# Patient Record
Sex: FEMALE | Race: BLACK OR AFRICAN AMERICAN | Employment: UNEMPLOYED | URBAN - METROPOLITAN AREA
[De-identification: names, ages, dates, MRNs, and addresses within clinical notes are randomized per-mention and may not be internally consistent; named-entity substitution may affect disease eponyms.]

---

## 2019-10-03 ENCOUNTER — HOSPITAL ENCOUNTER (EMERGENCY)
Age: 69
Discharge: HOME | End: 2019-10-03
Attending: EMERGENCY MEDICINE
Payer: MEDICARE

## 2019-10-03 VITALS
SYSTOLIC BLOOD PRESSURE: 175 MMHG | RESPIRATION RATE: 16 BRPM | HEART RATE: 75 BPM | HEIGHT: 59 IN | BODY MASS INDEX: 27.21 KG/M2 | OXYGEN SATURATION: 100 % | TEMPERATURE: 97.8 F | DIASTOLIC BLOOD PRESSURE: 91 MMHG | WEIGHT: 135 LBS

## 2019-10-03 DIAGNOSIS — T40.601A OPIATE OVERDOSE, ACCIDENTAL OR UNINTENTIONAL, INITIAL ENCOUNTER (HCC): Primary | ICD-10-CM

## 2019-10-03 LAB — GLUCOSE BLD STRIP.AUTO-MCNC: 120 MG/DL (ref 70–110)

## 2019-10-03 PROCEDURE — 82962 GLUCOSE BLOOD TEST: CPT

## 2019-10-03 PROCEDURE — 99284 EMERGENCY DEPT VISIT MOD MDM: CPT

## 2019-10-03 RX ORDER — NALOXONE HYDROCHLORIDE 4 MG/.1ML
SPRAY NASAL
Qty: 1 EACH | Refills: 0 | Status: SHIPPED | OUTPATIENT
Start: 2019-10-03 | End: 2021-07-21 | Stop reason: SDUPTHER

## 2019-10-03 NOTE — ED NOTES
was referred to patient by doctor.  went to [facility] to assess patient. Patient stated she was insured and she was homeless. Patient also stated she would like to receive follow up care in [place].  provided  follow up appointment for Adriano Abelherbie Soliz on 10/18/2019 at 6 am.  also provided care assistance to [employer] for suboxone, on 10/7/2019 for 8:30 am, and a list of things she will need to bring for admission.  would provide transportation to both appointments, which patient was informed on.  gave patient a food pantry and shelter list.Patient received  contact information.

## 2019-10-03 NOTE — ED PROVIDER NOTES
EMERGENCY DEPARTMENT HISTORY AND PHYSICAL EXAM    11:34 AM      Date: 10/3/2019  Patient Name: Bernice Brewer    History of Presenting Illness     Chief Complaint   Patient presents with    Drug Overdose         History Provided By: Patient  Location/Duration/Severity/Modifying factors   Patient is a 72-year-old female without reported medical history per patient presents the emergency department by EMS after being found somnolent with pinpoint pupils at a convenient store. The patient was staggering and  had slurred speech so EMS was called. EMS arrived and found the patient to pinpoint pupils and to be drowsy was given intranasal Narcan and then was immediately became alert and oriented. She initially was agitated per EMS his report however she is been calm during the majority of the transport. Patient in the emergency department is accompanied by police and is providing history however appears to be giving a limited history. Patient denies having a medical history but denies wanting to go to a doctor going to a doctor in the past.  Patient is to being a smoker however denies any drinking and is elusive about her drug use. Patient did state that she is taking aspirin that was all that she is taken. Patient denies any other aggravating relieving factors. PCP: None        Past History     Past Medical History:  No past medical history on file. Past Surgical History:  No past surgical history on file. Family History:  No family history on file. Social History:  Social History     Tobacco Use    Smoking status: Current Some Day Smoker     Packs/day: 0.25   Substance Use Topics    Alcohol use: Yes     Alcohol/week: 1.0 standard drinks     Types: 1 Cans of beer per week    Drug use: Yes     Types: Marijuana       Allergies:  No Known Allergies      Review of Systems       Review of Systems   Constitutional: Positive for activity change. Negative for fatigue and fever.    HENT: Negative for congestion and rhinorrhea. Eyes: Negative for visual disturbance. Respiratory: Negative for shortness of breath. Cardiovascular: Negative for chest pain and palpitations. Gastrointestinal: Negative for abdominal pain, diarrhea, nausea and vomiting. Genitourinary: Negative for dysuria and hematuria. Musculoskeletal: Negative for back pain. Skin: Negative for rash. Neurological: Negative for dizziness, weakness and light-headedness. All other systems reviewed and are negative. Physical Exam     Visit Vitals  BP (!) 175/91 (BP 1 Location: Left arm, BP Patient Position: At rest)   Pulse 75   Temp 97.8 °F (36.6 °C)   Resp 16   Ht 4' 11\" (1.499 m)   Wt 61.2 kg (135 lb)   SpO2 100%   BMI 27.27 kg/m²         Physical Exam   Constitutional: She is oriented to person, place, and time. She appears well-developed and well-nourished. No distress. HENT:   Head: Normocephalic and atraumatic. Right Ear: External ear normal.   Left Ear: External ear normal.   Nose: Nose normal.   Mouth/Throat: Oropharynx is clear and moist.   Eyes: Pupils are equal, round, and reactive to light. Conjunctivae and EOM are normal. No scleral icterus. Neck: Normal range of motion. Neck supple. No JVD present. No tracheal deviation present. No thyromegaly present. Cardiovascular: Normal rate, regular rhythm, normal heart sounds and intact distal pulses. Exam reveals no gallop and no friction rub. No murmur heard. Pulmonary/Chest: Effort normal and breath sounds normal. She exhibits no tenderness. Abdominal: Soft. Bowel sounds are normal. She exhibits no distension. There is no tenderness. There is no rebound and no guarding. Musculoskeletal: Normal range of motion. She exhibits no edema or tenderness. Lymphadenopathy:     She has no cervical adenopathy. Neurological: She is alert and oriented to person, place, and time. No cranial nerve deficit.  Coordination normal.   No sensory loss, Gait normal, Motor 5/5 Skin: Skin is warm and dry. Psychiatric: She has a normal mood and affect. Her behavior is normal. Judgment and thought content normal.   Seems a bit elusive when discussing drug past, please at the bedside however the patient is not in custody   Nursing note and vitals reviewed. Diagnostic Study Results     Labs -  Recent Results (from the past 12 hour(s))   GLUCOSE, POC    Collection Time: 10/03/19 12:12 PM   Result Value Ref Range    Glucose (POC) 120 (H) 70 - 110 mg/dL       Radiologic Studies -   No orders to display         Medical Decision Making   I am the first provider for this patient. I reviewed the vital signs, available nursing notes, past medical history, past surgical history, family history and social history. Vital Signs-Reviewed the patient's vital signs. Records Reviewed: Nursing Notes and Old Medical Records (Time of Review: 11:34 AM)    ED Course: Progress Notes, Reevaluation, and Consults: The police are now gone and the patient admits to using 1 cap of heroin a day. I discussed with her that I would like to get her in to some resources regarding opiate abuse and she is resistant at this time. She agrees to let me check her blood sugar and arrange for primary care doctor. We will continue to monitor her for any signs of re-sedation, follow her blood sugar, and follow her outpatient care plan. Isidro Dillard,  11:52 AM    Patient's glucose is reassuring and the patient is at her baseline. Patient was seen by the life coaches and has been given an appointment for primary care follow-up in the coming days. Patient has been educated on signs of worsening and will return if at all worsened or concerned. In addition we will give her a prescription for Narcan and education on how to use it. Patient is also referred to the community services Board to initiate substance abuse care. Isidro Dillard DO 1:04 PM        Provider Notes (Medical Decision Making): MDM  Number of Diagnoses or Management Options  Diagnosis management comments: Patient is a 71-year-old female without significant medical history however is avoidant of healthcare the presents emergency department after being found drowsy and altered at a convenient store. Patient had a quick response to Narcan given intranasally he did not have a blood glucose check and now is alert and oriented without any complaints. Suspect the patient did have an opiate overdose however I do not suspect patient is being forthcoming about her use patterns. I suspect this is due to the police being at the bedside and after discussing with police will likely not be in custody so will rediscuss the substance abuse. We will call her peer  as well as have her life coaches talk to her about Suboxone programs. We will also check her blood sugar if she will let me however she seems to be resistant to medical interventions. Leigha Alejo, DO 11:42 AM        Procedures          Diagnosis     Clinical Impression:   1. Opiate overdose, accidental or unintentional, initial encounter [facility])        Disposition: DC    Follow-up Information     Follow up With Specialties Details Why Contact [facility] MEDICINE  Go on 10/18/2019 established care with primary care doctor with Dr. Carlos Lopez on  10/18/2019 at 11 am. [address] [place] [address] in 3 days Suboxone Intake appointment. [address] [place] [address] Street  In 3 days Transportation  [address] [address] [phone]    [facility] EMERGENCY DEPT Emergency Medicine  As needed, If symptoms worsen [address]  [phone]           Patient's Medications   Start Taking    NALOXONE [facility]) 4 MG/ACTUATION NASAL SPRAY    Use 1 spray intranasally, then discard.  Repeat with new spray every 2 min as needed for opioid overdose symptoms, alternating nostrils. Continue Taking    No medications on file   These Medications have changed    No medications on file   Stop Taking    No medications on file     Disclaimer: Sections of this note are dictated using utilizing voice recognition software. Minor typographical errors may be present. If questions arise, please do not hesitate to contact me or call our department.

## 2019-10-03 NOTE — ED TRIAGE NOTES
Per EMS, Patient was in a store walking around, staff called, she was pinpoint. She was given x2 mg Narcan IN. Patient is alert and oriented x3.

## 2021-07-21 ENCOUNTER — HOSPITAL ENCOUNTER (EMERGENCY)
Age: 71
Discharge: HOME | End: 2021-07-22
Attending: EMERGENCY MEDICINE
Payer: MEDICARE

## 2021-07-21 VITALS
RESPIRATION RATE: 15 BRPM | SYSTOLIC BLOOD PRESSURE: 171 MMHG | HEART RATE: 89 BPM | OXYGEN SATURATION: 100 % | DIASTOLIC BLOOD PRESSURE: 91 MMHG | TEMPERATURE: 98.4 F

## 2021-07-21 DIAGNOSIS — T50.901A ACCIDENTAL OVERDOSE, INITIAL ENCOUNTER: Primary | ICD-10-CM

## 2021-07-21 LAB — GLUCOSE BLD STRIP.AUTO-MCNC: 118 MG/DL (ref 70–110)

## 2021-07-21 PROCEDURE — 99283 EMERGENCY DEPT VISIT LOW MDM: CPT

## 2021-07-21 PROCEDURE — 82962 GLUCOSE BLOOD TEST: CPT

## 2021-07-21 RX ORDER — NALOXONE HYDROCHLORIDE 4 MG/.1ML
SPRAY NASAL
Qty: 1 EACH | Refills: 0 | Status: SHIPPED | OUTPATIENT
Start: 2021-07-21

## 2021-07-21 NOTE — ED PROVIDER NOTES
EMERGENCY DEPARTMENT HISTORY AND PHYSICAL EXAM  This was created with voice recognition software and transcription errors may be present. 6:38 PM  Date: 7/21/2021  Patient Name: Margaree Leventhal    History of Presenting Illness     Chief Complaint:    History Provided By:     HPI: Margaree Leventhal is a 78 y.o. female no significant past medical history who was found unresponsive with pinpoint pupils for respirations outside of a convenience store. Patient was given Narcan with immediate resolution of symptoms. Patient is awake here she has no complaints whatsoever she is alert. Will not answer whether or not she did opiates but will also not deny it. Agrees no further work-up needs to be completed otherwise    PCP: None      Past History     Past Medical History:  No past medical history on file. Past Surgical History:  No past surgical history on file. Family History:  No family history on file. Social History:  Social History     Tobacco Use    Smoking status: Current Some Day Smoker     Packs/day: 0.25   Substance Use Topics    Alcohol use: Yes     Alcohol/week: 1.0 standard drinks     Types: 1 Cans of beer per week    Drug use: Yes     Types: Marijuana       Allergies:  No Known Allergies    Review of Systems     Review of Systems   All other systems reviewed and are negative. 10 point review of systems otherwise negative unless noted in HPI. Physical Exam       Physical Exam  Constitutional:       Appearance: She is well-developed. HENT:      Head: Normocephalic and atraumatic. Eyes:      Pupils: Pupils are equal, round, and reactive to light. Cardiovascular:      Rate and Rhythm: Normal rate and regular rhythm. Heart sounds: Normal heart sounds. No murmur heard. No friction rub. Pulmonary:      Effort: Pulmonary effort is normal. No respiratory distress. Breath sounds: Normal breath sounds. No wheezing. Abdominal:      General: There is no distension. Palpations: Abdomen is soft. Tenderness: There is no abdominal tenderness. There is no guarding or rebound. Musculoskeletal:         General: Normal range of motion. Cervical back: Normal range of motion and neck supple. Skin:     General: Skin is warm and dry. Neurological:      Mental Status: She is alert and oriented to person, place, and time. Psychiatric:         Behavior: Behavior normal.         Thought Content: Thought content normal.         Diagnostic Study Results     Vital Signs  EKG:  Labs:   Imaging:     Medical Decision Making     ED Course: Progress Notes, Reevaluation, and Consults:    I will be the provider of record for this patient. Provider Notes (Medical Decision Making):   Awake and alert accidental overdose denies suicidal ideation. Pt remains asx. will dc/     Diagnosis     Clinical Impression: No diagnosis found. Disposition:        Patient's Medications   Start Taking    No medications on file   Continue Taking    NALOXONE (NARCAN) 4 MG/ACTUATION NASAL SPRAY    Use 1 spray intranasally, then discard. Repeat with new spray every 2 min as needed for opioid overdose symptoms, alternating nostrils.    These Medications have changed    No medications on file   Stop Taking    No medications on file